# Patient Record
Sex: FEMALE | Race: WHITE | Employment: PART TIME | URBAN - METROPOLITAN AREA
[De-identification: names, ages, dates, MRNs, and addresses within clinical notes are randomized per-mention and may not be internally consistent; named-entity substitution may affect disease eponyms.]

---

## 2021-07-17 ENCOUNTER — HOSPITAL ENCOUNTER (EMERGENCY)
Age: 26
Discharge: HOME | End: 2021-07-17
Payer: MEDICAID

## 2021-07-17 ENCOUNTER — APPOINTMENT (OUTPATIENT)
Dept: ULTRASOUND IMAGING | Age: 26
End: 2021-07-17
Payer: MEDICAID

## 2021-07-17 VITALS
WEIGHT: 200 LBS | SYSTOLIC BLOOD PRESSURE: 127 MMHG | BODY MASS INDEX: 31.39 KG/M2 | HEART RATE: 70 BPM | RESPIRATION RATE: 16 BRPM | TEMPERATURE: 97.2 F | HEIGHT: 67 IN | OXYGEN SATURATION: 100 % | DIASTOLIC BLOOD PRESSURE: 99 MMHG

## 2021-07-17 DIAGNOSIS — B96.89 BACTERIAL VAGINOSIS IN PREGNANCY: ICD-10-CM

## 2021-07-17 DIAGNOSIS — R82.71 BACTERIURIA DURING PREGNANCY: ICD-10-CM

## 2021-07-17 DIAGNOSIS — R10.32 LEFT LOWER QUADRANT ABDOMINAL PAIN: ICD-10-CM

## 2021-07-17 DIAGNOSIS — O23.599 BACTERIAL VAGINOSIS IN PREGNANCY: ICD-10-CM

## 2021-07-17 DIAGNOSIS — Z3A.01 LESS THAN 8 WEEKS GESTATION OF PREGNANCY: Primary | ICD-10-CM

## 2021-07-17 DIAGNOSIS — Z72.0 TOBACCO ABUSE: ICD-10-CM

## 2021-07-17 DIAGNOSIS — O99.891 BACTERIURIA DURING PREGNANCY: ICD-10-CM

## 2021-07-17 LAB
A/G RATIO: 1.4 (ref 1.1–2.2)
ALBUMIN SERPL-MCNC: 3.7 G/DL (ref 3.4–5)
ALP BLD-CCNC: 46 U/L (ref 40–129)
ALT SERPL-CCNC: 10 U/L (ref 10–40)
AMORPHOUS: ABNORMAL /HPF
ANION GAP SERPL CALCULATED.3IONS-SCNC: 9 MMOL/L (ref 3–16)
AST SERPL-CCNC: 11 U/L (ref 15–37)
BACTERIA WET PREP: ABNORMAL
BACTERIA: ABNORMAL /HPF
BASOPHILS ABSOLUTE: 0 K/UL (ref 0–0.2)
BASOPHILS RELATIVE PERCENT: 0.5 %
BILIRUB SERPL-MCNC: 0.9 MG/DL (ref 0–1)
BILIRUBIN URINE: NEGATIVE
BLOOD, URINE: NEGATIVE
BUN BLDV-MCNC: 7 MG/DL (ref 7–20)
CALCIUM SERPL-MCNC: 8.9 MG/DL (ref 8.3–10.6)
CHLORIDE BLD-SCNC: 107 MMOL/L (ref 99–110)
CLARITY: CLEAR
CLUE CELLS: ABNORMAL
CO2: 23 MMOL/L (ref 21–32)
COLOR: YELLOW
CREAT SERPL-MCNC: <0.5 MG/DL (ref 0.6–1.1)
CRYSTALS, UA: ABNORMAL /HPF
EOSINOPHILS ABSOLUTE: 0.2 K/UL (ref 0–0.6)
EOSINOPHILS RELATIVE PERCENT: 2.5 %
EPITHELIAL CELLS WET PREP: ABNORMAL
EPITHELIAL CELLS, UA: ABNORMAL /HPF (ref 0–5)
GFR AFRICAN AMERICAN: >60
GFR NON-AFRICAN AMERICAN: >60
GLOBULIN: 2.6 G/DL
GLUCOSE BLD-MCNC: 63 MG/DL (ref 70–99)
GLUCOSE URINE: NEGATIVE MG/DL
GONADOTROPIN, CHORIONIC (HCG) QUANT: NORMAL MIU/ML
HCG QUALITATIVE: POSITIVE
HCT VFR BLD CALC: 39 % (ref 36–48)
HEMOGLOBIN: 13.4 G/DL (ref 12–16)
KETONES, URINE: ABNORMAL MG/DL
LEUKOCYTE ESTERASE, URINE: ABNORMAL
LYMPHOCYTES ABSOLUTE: 2.2 K/UL (ref 1–5.1)
LYMPHOCYTES RELATIVE PERCENT: 23.5 %
MAGNESIUM: 1.8 MG/DL (ref 1.8–2.4)
MCH RBC QN AUTO: 30.9 PG (ref 26–34)
MCHC RBC AUTO-ENTMCNC: 34.5 G/DL (ref 31–36)
MCV RBC AUTO: 89.6 FL (ref 80–100)
MICROSCOPIC EXAMINATION: YES
MONOCYTES ABSOLUTE: 0.7 K/UL (ref 0–1.3)
MONOCYTES RELATIVE PERCENT: 7.4 %
MUCUS: ABNORMAL /LPF
NEUTROPHILS ABSOLUTE: 6.1 K/UL (ref 1.7–7.7)
NEUTROPHILS RELATIVE PERCENT: 66.1 %
NITRITE, URINE: NEGATIVE
PDW BLD-RTO: 12.8 % (ref 12.4–15.4)
PH UA: 6 (ref 5–8)
PLATELET # BLD: 290 K/UL (ref 135–450)
PMV BLD AUTO: 7.4 FL (ref 5–10.5)
POTASSIUM REFLEX MAGNESIUM: 3.5 MMOL/L (ref 3.5–5.1)
PROTEIN UA: NEGATIVE MG/DL
RBC # BLD: 4.36 M/UL (ref 4–5.2)
RBC UA: ABNORMAL /HPF (ref 0–4)
RBC WET PREP: ABNORMAL
SODIUM BLD-SCNC: 139 MMOL/L (ref 136–145)
SOURCE WET PREP: ABNORMAL
SPECIFIC GRAVITY UA: >=1.03 (ref 1–1.03)
TOTAL PROTEIN: 6.3 G/DL (ref 6.4–8.2)
TRICHOMONAS PREP: ABNORMAL
TROPONIN: <0.01 NG/ML
URINE TYPE: ABNORMAL
UROBILINOGEN, URINE: 1 E.U./DL
WBC # BLD: 9.3 K/UL (ref 4–11)
WBC UA: ABNORMAL /HPF (ref 0–5)
WBC WET PREP: ABNORMAL
YEAST WET PREP: ABNORMAL

## 2021-07-17 PROCEDURE — 81001 URINALYSIS AUTO W/SCOPE: CPT

## 2021-07-17 PROCEDURE — 87210 SMEAR WET MOUNT SALINE/INK: CPT

## 2021-07-17 PROCEDURE — 84703 CHORIONIC GONADOTROPIN ASSAY: CPT

## 2021-07-17 PROCEDURE — 84484 ASSAY OF TROPONIN QUANT: CPT

## 2021-07-17 PROCEDURE — 80053 COMPREHEN METABOLIC PANEL: CPT

## 2021-07-17 PROCEDURE — 99284 EMERGENCY DEPT VISIT MOD MDM: CPT

## 2021-07-17 PROCEDURE — 83735 ASSAY OF MAGNESIUM: CPT

## 2021-07-17 PROCEDURE — 87491 CHLMYD TRACH DNA AMP PROBE: CPT

## 2021-07-17 PROCEDURE — 84702 CHORIONIC GONADOTROPIN TEST: CPT

## 2021-07-17 PROCEDURE — 87591 N.GONORRHOEAE DNA AMP PROB: CPT

## 2021-07-17 PROCEDURE — 85025 COMPLETE CBC W/AUTO DIFF WBC: CPT

## 2021-07-17 PROCEDURE — 76801 OB US < 14 WKS SINGLE FETUS: CPT

## 2021-07-17 RX ORDER — METRONIDAZOLE 500 MG/1
500 TABLET ORAL 3 TIMES DAILY
Qty: 30 TABLET | Refills: 0 | Status: SHIPPED | OUTPATIENT
Start: 2021-07-17 | End: 2021-07-27

## 2021-07-17 RX ORDER — VITAMIN A ACETATE, BETA CAROTENE, ASCORBIC ACID, CHOLECALCIFEROL, .ALPHA.-TOCOPHEROL ACETATE, DL-, THIAMINE MONONITRATE, RIBOFLAVIN, NIACINAMIDE, PYRIDOXINE HYDROCHLORIDE, FOLIC ACID, CYANOCOBALAMIN, CALCIUM CARBONATE, FERROUS FUMARATE, ZINC OXIDE, CUPRIC OXIDE 3080; 12; 120; 400; 1; 1.84; 3; 20; 22; 920; 25; 200; 27; 10; 2 [IU]/1; UG/1; MG/1; [IU]/1; MG/1; MG/1; MG/1; MG/1; MG/1; [IU]/1; MG/1; MG/1; MG/1; MG/1; MG/1
1 TABLET, FILM COATED ORAL DAILY
Qty: 90 TABLET | Refills: 0 | Status: SHIPPED | OUTPATIENT
Start: 2021-07-17 | End: 2021-10-15

## 2021-07-17 ASSESSMENT — PAIN SCALES - GENERAL: PAINLEVEL_OUTOF10: 5

## 2021-07-19 LAB
C TRACH DNA GENITAL QL NAA+PROBE: NEGATIVE
N. GONORRHOEAE DNA: NEGATIVE

## 2021-07-19 NOTE — ED PROVIDER NOTES
[address] ED  EMERGENCY DEPARTMENT ENCOUNTER        Pt Name: Denae Holden  MRN: [id number]  [place] 1995  Date of evaluation: 7/17/2021  Provider: TONI Mcpherson  PCP: No primary care provider on file. This patient was not seen and evaluated by the attending physician No att. providers found. I have evaluated this patient. My supervising physician was available for consultation. CHIEF COMPLAINT       Chief Complaint   Patient presents with    Abdominal Pain     LUQ abd pain x 3 weeks. HISTORY OF PRESENT ILLNESS   (Location/Symptom, Timing/Onset, Context/Setting, Quality, Duration, Modifying Factors, Severity)  Note limiting factors. Denae Holden is a 27 y.o. female who presents via private vehicle from her home for evaluation of abdominal pain. Patient notes that she has been having intermittent abdominal discomfort to the left side for the last couple days. Upon questioning she notes is also been going on for the last several weeks. She endorses associated nausea no vomiting. She denies any vaginal bleeding vaginal pain no discharge or concern for sexually transmitted infection. She notes that she is approximately 3 weeks late for her menstrual period. He denies any urinary symptoms no dysuria hematuria urinary frequency urgency. She denies any chest pain cough shortness of breath. No fevers. Nursing Notes were all reviewed and agreed with or any disagreements were addressed  in the HPI. Pt was seen during the [place] 19 pandemic. Appropriate PPE worn by ME during patient encounters. Pt seen during a time with constrained hospital bed capacity and other potential inpatient and outpatient resources were constrained due to the viral pandemic. REVIEW OF SYSTEMS    (2-9 systems for level 4, 10 or more for level 5)     Review of Systems    Positives and Pertinent negatives as per HPI.   Except as noted abovein the ROS, all other systems were reviewed and negative. PAST MEDICAL HISTORY     Past Medical History:   Diagnosis Date    Asthma     PCOS (polycystic ovarian syndrome)          SURGICAL HISTORY     Past Surgical History:   Procedure Laterality Date    TONSILLECTOMY           CURRENTMEDICATIONS       Discharge Medication List as of 7/17/2021  6:04 PM            ALLERGIES     Adhesive tape and Pcn [penicillins]    FAMILYHISTORY     History reviewed. No pertinent family history. SOCIAL HISTORY       Social History     Socioeconomic History    Marital status:      Spouse name: None    Number of children: None    Years of education: None    Highest education level: None   Occupational History    None   Tobacco Use    Smoking status: Current Every Day Smoker     Packs/day: 1.00     Types: Cigarettes    Smokeless tobacco: Never Used   Substance and Sexual Activity    Alcohol use: No    Drug use: Yes     Types: Methamphetamines    Sexual activity: Yes     Partners: Male   Other Topics Concern    None   Social History Narrative    None     Social Determinants of Health     Financial Resource Strain:     Difficulty of Paying Living Expenses:    Food Insecurity:     Worried About Running Out of Food in the Last Year:     Ran Out of Food in the Last Year:    Transportation Needs:     Lack of Transportation (Medical):      Lack of Transportation (Non-Medical):    Physical Activity:     Days of Exercise per Week:     Minutes of Exercise per Session:    Stress:     Feeling of Stress :    Social Connections:     Frequency of Communication with Friends and Family:     Frequency of Social Gatherings with Friends and Family:     Attends Anglican Services:     Active Member of Clubs or Organizations:     Attends Club or Organization Meetings:     Marital Status:    Intimate Partner Violence:     Fear of Current or Ex-Partner:     Emotionally Abused:     Physically Abused:     Sexually Abused:        SCREENINGS dry.      Capillary Refill: Capillary refill takes less than 2 seconds. Coloration: Skin is not jaundiced. Findings: No rash. Neurological:      General: No focal deficit present. Mental Status: She is alert, oriented to person, place, and time and easily aroused. Sensory: No sensory deficit. Motor: No weakness. Gait: Gait normal.   Psychiatric:         Behavior: Behavior normal. Behavior is cooperative. DIAGNOSTIC RESULTS   LABS:    Labs Reviewed   WET PREP, GENITAL - Abnormal; Notable for the following components:       Result Value    Clue Cells, Wet Prep 1+ (*)     All other components within normal limits    Narrative:     Performed at:  [facility] [place],  [facility]   Phone [phone]   COMPREHENSIVE METABOLIC PANEL W/ REFLEX TO MG FOR LOW K - Abnormal; Notable for the following components:    Glucose 63 (*)     CREATININE <0.5 (*)     Total Protein 6.3 (*)     AST 11 (*)     All other components within normal limits    Narrative:     Performed at:  [place] [address],  [employer] [facility]   Phone [phone]   URINALYSIS - Abnormal; Notable for the following components:    Ketones, Urine TRACE (*)     Leukocyte Esterase, Urine SMALL (*)     All other components within normal limits    Narrative:     Performed at:  [facility] [place],  [employer]   Phone [phone]   MICROSCOPIC URINALYSIS - Abnormal; Notable for the following components:    Mucus, UA 2+ (*)     WBC, UA 10-20 (*)     Bacteria, UA 2+ (*)     Crystals, UA Few Ca.  Oxalate (*)     All other components within normal limits    Narrative:     Performed at:  [facility],  [employer]   Phone [phone]   C.TRACHOMATIS [facility] DNA   CBC WITH AUTO DIFFERENTIAL    Narrative: Performed at:  [facility],  [place], [facility]   Phone [phone]   MAGNESIUM    Narrative:     Performed at:  [facility]) - [facility],  [place], [facility]   Phone [phone]   HCG, SERUM, QUALITATIVE    Narrative:     Performed at:  [facility],  [place], [facility]   Phone [phone]   TROPONIN    Narrative:     Performed at:  [facility]) - [facility],  [place], [facility]   Phone [phone]   HCG, QUANTITATIVE, PREGNANCY    Narrative:     Performed at:  [facility],  [place], [facility]   Phone [phone]       All other labs were within normal range or not returned as of this dictation. EKG: All EKG's are interpreted by the Emergency Department Physician who either signs orCo-signs this chart in the absence of a cardiologist.  Please see their note for interpretation of EKG. RADIOLOGY:   Non-plain film images such as CT, Ultrasound and MRI are read by the radiologist. Plain radiographic images are visualized andpreliminarily interpreted by the  ED Provider with the below findings:        Interpretation perthe Radiologist below, if available at the time of this note:    US OB LESS THAN 14 [facility]   Final Result   Single live intrauterine pregnancy with an estimated gestational age of 7.0   weeks. No significant adnexal abnormality. Normal Doppler flow. US PELVIS COMPLETE NON-OB TRANSABDOMINAL AND TRANSVAGINAL    (Results Pending)     US OB LESS THAN 14 WEEKS SINGLE OR FIRST GESTATION    Result Date: 7/18/2021  EXAMINATION: FIRST TRIMESTER OBSTETRIC ULTRASOUND 7/17/2021 TECHNIQUE: Transabdominal and transvaginal first trimester obstetric pelvic ultrasound was performed with color Doppler flow evaluation.  COMPARISON: None HISTORY: ORDERING SYSTEM PROVIDED HISTORY: ectopic TECHNOLOGIST PROVIDED HISTORY: Reason for exam:->ectopic Beta hCG 34,603 FINDINGS: Uterus: 8.3 x 5.4 x 4.1 cm Gestational Sac(s):  Single normal appearing gestational sac. No evidence of subchorionic hemorrhage. Yolk Sac:  Present Fetal Pole:  Single fetal pole Crown Rump Length:  1.09 cm Fetal Heart Rate:  147 beats per minute Right ovary: 2.8 x 2.8 x 2.5 cm. Normal Doppler flow. Left ovary: 3.1 x 3.0 x 3.1 cm. Normal Doppler flow. Free fluid: None visualized Measurements: Estimated gestational age by current ultrasound: 7.0 weeks Estimated gestational by LMP/prior ultrasound: 7.5 weeks Estimated Due Date: 3/5/2022     Single live intrauterine pregnancy with an estimated gestational age of 7.0 weeks. No significant adnexal abnormality. Normal Doppler flow. PROCEDURES   Unless otherwise noted below, none     Procedures    CRITICAL CARE TIME   N/A    CONSULTS:  None      EMERGENCY DEPARTMENT COURSE and DIFFERENTIALDIAGNOSIS/MDM:   Vitals:    Vitals:    07/17/21 1503 07/17/21 1530 07/17/21 1603 07/17/21 1803   BP: 109/83 121/82 125/88 (!) 127/99   Pulse: 68 70 64 70   Resp: 16 16 16 16   Temp:       TempSrc:       SpO2: 100% 100% 100% 100%   Weight:       Height:           Patient was given thefollowing medications:  Medications - No data to display    PDMP Monitoring:    Last PDMP Gregory as Reviewed [facility]):  Review User Review Instant Review Result            Urine Drug Screenings (1 yr)    No resulted procedures found. Medication Contract and Consent for Opioid Use Documents Filed      No documents found                MDM:   Patient seen and evaluated. Old records reviewed. Diagnostic testing reviewed and results discussed. I have independently evaluated this patient based upon my scope of practice. Supervising physician was in the department for consultation as needed.      Patient is a 13-year-old female who presents for evaluation of abdominal pain    3. Tobacco abuse    4. Bacteriuria during pregnancy    5.  Bacterial vaginosis in pregnancy          DISPOSITION/PLAN   DISPOSITION Decision To Discharge 07/17/2021 05:45:32 PM      PATIENT REFERREDTO:  Your Gynecologist    Schedule an appointment as soon as possible for a visit       [place] [facility]  [address] [facility]  Go to   If symptoms worsen      DISCHARGE MEDICATIONS:  Discharge Medication List as of 7/17/2021  6:04 PM      START taking these medications    Details   Prenatal Vit-Fe Fumarate-FA (PRENATAL PLUS) 27-1 MG TABS Take 1 tablet by mouth daily, Disp-90 tablet, R-0Print      metroNIDAZOLE (FLAGYL) 500 MG tablet Take 1 tablet by mouth 3 times daily for 10 days, Disp-30 tablet, R-0Print             DISCONTINUED MEDICATIONS:  Discharge Medication List as of 7/17/2021  6:04 PM                 (Please note that portions ofthis note were completed with a voice recognition program.  Efforts were made to edit the dictations but occasionally words are mis-transcribed.)    Sabi Shabazz (electronically signed)        Sabi Shabazz  07/18/21 1584

## 2021-09-13 LAB
ABO, EXTERNAL RESULT: NORMAL
HEP B, EXTERNAL RESULT: NEGATIVE
HEPATITIS C ANTIBODY, EXTERNAL RESULT: NEGATIVE
HIV, EXTERNAL RESULT: NON REACTIVE
RH FACTOR, EXTERNAL RESULT: POSITIVE
RPR, EXTERNAL RESULT: NON REACTIVE
RUBELLA TITER, EXTERNAL RESULT: NORMAL

## 2021-10-15 LAB
C. TRACHOMATIS, EXTERNAL RESULT: NEGATIVE
N. GONORRHOEAE, EXTERNAL RESULT: NEGATIVE

## 2022-01-11 ENCOUNTER — ANESTHESIA EVENT (OUTPATIENT)
Dept: LABOR AND DELIVERY | Age: 27
DRG: 560 | End: 2022-01-11
Payer: MEDICAID

## 2022-01-11 ENCOUNTER — ANESTHESIA (OUTPATIENT)
Dept: LABOR AND DELIVERY | Age: 27
DRG: 560 | End: 2022-01-11
Payer: MEDICAID

## 2022-01-11 ENCOUNTER — HOSPITAL ENCOUNTER (INPATIENT)
Age: 27
LOS: 1 days | Discharge: HOME | DRG: 560 | End: 2022-01-12
Attending: OBSTETRICS & GYNECOLOGY | Admitting: OBSTETRICS & GYNECOLOGY
Payer: MEDICAID

## 2022-01-11 PROBLEM — E66.812 CLASS II OBESITY: Status: ACTIVE | Noted: 2022-01-11

## 2022-01-11 PROBLEM — O42.90 RUPTURED MEMBRANES, PROLONGED: Status: ACTIVE | Noted: 2022-01-11

## 2022-01-11 PROBLEM — E66.9 CLASS II OBESITY: Status: ACTIVE | Noted: 2022-01-11

## 2022-01-11 PROBLEM — F15.10 METHAMPHETAMINE ABUSE (HCC): Status: ACTIVE | Noted: 2022-01-11

## 2022-01-11 PROBLEM — O60.00 PRETERM LABOR: Status: ACTIVE | Noted: 2022-01-11

## 2022-01-11 LAB
ABO/RH: NORMAL
AMPHETAMINE SCREEN, URINE: POSITIVE
ANISOCYTOSIS: ABNORMAL
ANTIBODY SCREEN: NORMAL
ATYPICAL LYMPHOCYTE RELATIVE PERCENT: 1 % (ref 0–6)
BACTERIA: ABNORMAL /HPF
BANDED NEUTROPHILS RELATIVE PERCENT: 51 % (ref 0–7)
BARBITURATE SCREEN URINE: ABNORMAL
BASOPHILS ABSOLUTE: 0 K/UL (ref 0–0.2)
BASOPHILS RELATIVE PERCENT: 0 %
BENZODIAZEPINE SCREEN, URINE: ABNORMAL
BILIRUBIN URINE: NEGATIVE
BLOOD, URINE: ABNORMAL
BUPRENORPHINE URINE: ABNORMAL
CANNABINOID SCREEN URINE: ABNORMAL
CLARITY: CLEAR
COCAINE METABOLITE SCREEN URINE: ABNORMAL
COLOR: ABNORMAL
EOSINOPHILS ABSOLUTE: 0 K/UL (ref 0–0.6)
EOSINOPHILS RELATIVE PERCENT: 0 %
EPITHELIAL CELLS, UA: ABNORMAL /HPF (ref 0–5)
GLUCOSE URINE: NEGATIVE MG/DL
HCT VFR BLD CALC: 40.1 % (ref 36–48)
HEMOGLOBIN: 13.8 G/DL (ref 12–16)
KETONES, URINE: NEGATIVE MG/DL
LEUKOCYTE ESTERASE, URINE: ABNORMAL
LYMPHOCYTES ABSOLUTE: 1.4 K/UL (ref 1–5.1)
LYMPHOCYTES RELATIVE PERCENT: 3 %
Lab: ABNORMAL
MCH RBC QN AUTO: 30.5 PG (ref 26–34)
MCHC RBC AUTO-ENTMCNC: 34.5 G/DL (ref 31–36)
MCV RBC AUTO: 88.5 FL (ref 80–100)
METHADONE SCREEN, URINE: ABNORMAL
MICROSCOPIC EXAMINATION: YES
MONOCYTES ABSOLUTE: 2.5 K/UL (ref 0–1.3)
MONOCYTES RELATIVE PERCENT: 7 %
MUCUS: ABNORMAL /LPF
NEUTROPHILS ABSOLUTE: 31.4 K/UL (ref 1.7–7.7)
NEUTROPHILS RELATIVE PERCENT: 38 %
NITRITE, URINE: NEGATIVE
OPIATE SCREEN URINE: ABNORMAL
OXYCODONE URINE: ABNORMAL
PDW BLD-RTO: 12.6 % (ref 12.4–15.4)
PH UA: 6
PH UA: 6 (ref 5–8)
PHENCYCLIDINE SCREEN URINE: ABNORMAL
PLATELET # BLD: 262 K/UL (ref 135–450)
PLATELET SLIDE REVIEW: ADEQUATE
PMV BLD AUTO: 8.3 FL (ref 5–10.5)
POIKILOCYTES: ABNORMAL
PROPOXYPHENE SCREEN: ABNORMAL
PROTEIN UA: ABNORMAL MG/DL
RBC # BLD: 4.54 M/UL (ref 4–5.2)
RBC UA: ABNORMAL /HPF (ref 0–4)
SLIDE REVIEW: ABNORMAL
SPECIFIC GRAVITY UA: >=1.03 (ref 1–1.03)
TOXIC GRANULATION: PRESENT
URINE TYPE: ABNORMAL
UROBILINOGEN, URINE: 0.2 E.U./DL
VACUOLATED NEUTROPHILS: PRESENT
WBC # BLD: 35.3 K/UL (ref 4–11)
WBC UA: ABNORMAL /HPF (ref 0–5)

## 2022-01-11 PROCEDURE — 86900 BLOOD TYPING SEROLOGIC ABO: CPT

## 2022-01-11 PROCEDURE — 1220000000 HC SEMI PRIVATE OB R&B

## 2022-01-11 PROCEDURE — 86901 BLOOD TYPING SEROLOGIC RH(D): CPT

## 2022-01-11 PROCEDURE — 81001 URINALYSIS AUTO W/SCOPE: CPT

## 2022-01-11 PROCEDURE — 2500000003 HC RX 250 WO HCPCS: Performed by: NURSE ANESTHETIST, CERTIFIED REGISTERED

## 2022-01-11 PROCEDURE — 51701 INSERT BLADDER CATHETER: CPT

## 2022-01-11 PROCEDURE — 80307 DRUG TEST PRSMV CHEM ANLYZR: CPT

## 2022-01-11 PROCEDURE — 2580000003 HC RX 258: Performed by: OBSTETRICS & GYNECOLOGY

## 2022-01-11 PROCEDURE — 86780 TREPONEMA PALLIDUM: CPT

## 2022-01-11 PROCEDURE — 85025 COMPLETE CBC W/AUTO DIFF WBC: CPT

## 2022-01-11 PROCEDURE — 86850 RBC ANTIBODY SCREEN: CPT

## 2022-01-11 PROCEDURE — 6360000002 HC RX W HCPCS: Performed by: OBSTETRICS & GYNECOLOGY

## 2022-01-11 RX ORDER — ONDANSETRON 2 MG/ML
4 INJECTION INTRAMUSCULAR; INTRAVENOUS EVERY 6 HOURS PRN
Status: DISCONTINUED | OUTPATIENT
Start: 2022-01-11 | End: 2022-01-12

## 2022-01-11 RX ORDER — SODIUM CHLORIDE, SODIUM LACTATE, POTASSIUM CHLORIDE, AND CALCIUM CHLORIDE .6; .31; .03; .02 G/100ML; G/100ML; G/100ML; G/100ML
500 INJECTION, SOLUTION INTRAVENOUS PRN
Status: DISCONTINUED | OUTPATIENT
Start: 2022-01-11 | End: 2022-01-12

## 2022-01-11 RX ORDER — DOCUSATE SODIUM 100 MG/1
100 CAPSULE, LIQUID FILLED ORAL 2 TIMES DAILY
Status: DISCONTINUED | OUTPATIENT
Start: 2022-01-11 | End: 2022-01-12

## 2022-01-11 RX ORDER — SODIUM CHLORIDE, SODIUM LACTATE, POTASSIUM CHLORIDE, AND CALCIUM CHLORIDE .6; .31; .03; .02 G/100ML; G/100ML; G/100ML; G/100ML
1000 INJECTION, SOLUTION INTRAVENOUS PRN
Status: DISCONTINUED | OUTPATIENT
Start: 2022-01-11 | End: 2022-01-12

## 2022-01-11 RX ORDER — SODIUM CHLORIDE 9 MG/ML
25 INJECTION, SOLUTION INTRAVENOUS PRN
Status: DISCONTINUED | OUTPATIENT
Start: 2022-01-11 | End: 2022-01-12

## 2022-01-11 RX ORDER — BUPIVACAINE HYDROCHLORIDE 2.5 MG/ML
INJECTION, SOLUTION EPIDURAL; INFILTRATION; INTRACAUDAL
Status: COMPLETED
Start: 2022-01-11 | End: 2022-01-11

## 2022-01-11 RX ORDER — ACETAMINOPHEN 325 MG/1
650 TABLET ORAL EVERY 4 HOURS PRN
Status: DISCONTINUED | OUTPATIENT
Start: 2022-01-11 | End: 2022-01-12

## 2022-01-11 RX ORDER — ACETAMINOPHEN 325 MG/1
650 TABLET ORAL EVERY 6 HOURS PRN
COMMUNITY

## 2022-01-11 RX ORDER — SODIUM CHLORIDE 0.9 % (FLUSH) 0.9 %
5-40 SYRINGE (ML) INJECTION EVERY 12 HOURS SCHEDULED
Status: DISCONTINUED | OUTPATIENT
Start: 2022-01-11 | End: 2022-01-12

## 2022-01-11 RX ORDER — SODIUM CHLORIDE 0.9 % (FLUSH) 0.9 %
5-40 SYRINGE (ML) INJECTION PRN
Status: DISCONTINUED | OUTPATIENT
Start: 2022-01-11 | End: 2022-01-12

## 2022-01-11 RX ORDER — SODIUM CHLORIDE, SODIUM LACTATE, POTASSIUM CHLORIDE, CALCIUM CHLORIDE 600; 310; 30; 20 MG/100ML; MG/100ML; MG/100ML; MG/100ML
INJECTION, SOLUTION INTRAVENOUS CONTINUOUS
Status: DISCONTINUED | OUTPATIENT
Start: 2022-01-11 | End: 2022-01-12

## 2022-01-11 RX ORDER — BUPIVACAINE HYDROCHLORIDE 2.5 MG/ML
INJECTION, SOLUTION EPIDURAL; INFILTRATION; INTRACAUDAL PRN
Status: DISCONTINUED | OUTPATIENT
Start: 2022-01-11 | End: 2022-01-12 | Stop reason: SDUPTHER

## 2022-01-11 RX ORDER — BUPIVACAINE HYDROCHLORIDE 5 MG/ML
INJECTION, SOLUTION EPIDURAL; INTRACAUDAL
Status: COMPLETED
Start: 2022-01-11 | End: 2022-01-12

## 2022-01-11 RX ORDER — BETAMETHASONE SODIUM PHOSPHATE AND BETAMETHASONE ACETATE 3; 3 MG/ML; MG/ML
12 INJECTION, SUSPENSION INTRA-ARTICULAR; INTRALESIONAL; INTRAMUSCULAR; SOFT TISSUE EVERY 24 HOURS
Status: DISCONTINUED | OUTPATIENT
Start: 2022-01-11 | End: 2022-01-12

## 2022-01-11 RX ADMIN — SODIUM CHLORIDE, POTASSIUM CHLORIDE, SODIUM LACTATE AND CALCIUM CHLORIDE: 600; 310; 30; 20 INJECTION, SOLUTION INTRAVENOUS at 23:16

## 2022-01-11 RX ADMIN — Medication 15 ML/HR: at 20:05

## 2022-01-11 RX ADMIN — BETAMETHASONE SODIUM PHOSPHATE AND BETAMETHASONE ACETATE 12 MG: 3; 3 INJECTION, SUSPENSION INTRA-ARTICULAR; INTRALESIONAL; INTRAMUSCULAR at 19:27

## 2022-01-11 RX ADMIN — BUPIVACAINE HYDROCHLORIDE 2 ML: 2.5 INJECTION, SOLUTION EPIDURAL; INFILTRATION; INTRACAUDAL; PERINEURAL at 20:00

## 2022-01-11 RX ADMIN — SODIUM CHLORIDE, POTASSIUM CHLORIDE, SODIUM LACTATE AND CALCIUM CHLORIDE: 600; 310; 30; 20 INJECTION, SOLUTION INTRAVENOUS at 19:50

## 2022-01-11 ASSESSMENT — LIFESTYLE VARIABLES: SMOKING_STATUS: 1

## 2022-01-11 NOTE — PROGRESS NOTES
Brief Note:    S: Called to see patient due to suspected PPROM and possible advanced cervical dilation. Reports some ? able leakage of fluid last evening after intercourse with contractions that started this afternoon after seen in the office. No VB, +FM. OB History    Para Term  AB Living   1             SAB IAB Ectopic Molar Multiple Live Births                    # Outcome Date GA Lbr Douglas/2nd Weight Sex Delivery Anes PTL Lv   1 Current              Past Medical History:   Diagnosis Date    Asthma     PCOS (polycystic ovarian syndrome)      Past Surgical History:   Procedure Laterality Date    TONSILLECTOMY         No current facility-administered medications on file prior to encounter. Current Outpatient Medications on File Prior to Encounter   Medication Sig Dispense Refill    Prenatal Vit-Fe Fumarate-FA (PRENATAL PLUS) 27-1 MG TABS Take 1 tablet by mouth daily 90 tablet 0       O:  Vitals:    22 1832   Resp: 20   Temp: 98 °F (36.7 °C)     Appears very uncomfortable  Vaginal exam: appears ruptured on external exam, no SSE done due to pt discomfort and concern for possible precipitous delivery   SVE: 3/75/0     FHT: difficult to monitor due to patient discomfort, 165, min-mod variability, +accel, ?variable decels  Zaleski: q3-4 min    A/P: Patient is a 31yo  at 32+3 with suspected PPROM and PTL    1. FWB   - difficult to monitor due to patient discomfort and movements    2. PTL/PPROM  - appears grossly ruptured, 3-4cm dilated on exam    Exam/findings communicated by me directly to Dr. Cyndee Marlow, plan for admission, antibiotics, and steroids for fetal lung maturity.      Sampson Carter MD

## 2022-01-12 VITALS
HEART RATE: 86 BPM | SYSTOLIC BLOOD PRESSURE: 107 MMHG | DIASTOLIC BLOOD PRESSURE: 62 MMHG | OXYGEN SATURATION: 100 % | TEMPERATURE: 98 F | WEIGHT: 233 LBS | BODY MASS INDEX: 36.57 KG/M2 | HEIGHT: 67 IN | RESPIRATION RATE: 16 BRPM

## 2022-01-12 LAB
SARS-COV-2, NAAT: NOT DETECTED
TOTAL SYPHILLIS IGG/IGM: NORMAL

## 2022-01-12 PROCEDURE — 6370000000 HC RX 637 (ALT 250 FOR IP): Performed by: OBSTETRICS & GYNECOLOGY

## 2022-01-12 PROCEDURE — 88307 TISSUE EXAM BY PATHOLOGIST: CPT

## 2022-01-12 PROCEDURE — 6360000002 HC RX W HCPCS

## 2022-01-12 PROCEDURE — 2500000003 HC RX 250 WO HCPCS: Performed by: NURSE ANESTHETIST, CERTIFIED REGISTERED

## 2022-01-12 PROCEDURE — 87635 SARS-COV-2 COVID-19 AMP PRB: CPT

## 2022-01-12 PROCEDURE — 6370000000 HC RX 637 (ALT 250 FOR IP)

## 2022-01-12 PROCEDURE — 0HQ9XZZ REPAIR PERINEUM SKIN, EXTERNAL APPROACH: ICD-10-PCS | Performed by: OBSTETRICS & GYNECOLOGY

## 2022-01-12 PROCEDURE — 51701 INSERT BLADDER CATHETER: CPT

## 2022-01-12 PROCEDURE — 7200000001 HC VAGINAL DELIVERY

## 2022-01-12 RX ORDER — SODIUM CHLORIDE, SODIUM LACTATE, POTASSIUM CHLORIDE, CALCIUM CHLORIDE 600; 310; 30; 20 MG/100ML; MG/100ML; MG/100ML; MG/100ML
INJECTION, SOLUTION INTRAVENOUS CONTINUOUS
Status: DISCONTINUED | OUTPATIENT
Start: 2022-01-12 | End: 2022-01-12 | Stop reason: HOSPADM

## 2022-01-12 RX ORDER — SODIUM CHLORIDE 0.9 % (FLUSH) 0.9 %
5-40 SYRINGE (ML) INJECTION PRN
Status: DISCONTINUED | OUTPATIENT
Start: 2022-01-12 | End: 2022-01-12 | Stop reason: HOSPADM

## 2022-01-12 RX ORDER — IBUPROFEN 800 MG/1
800 TABLET ORAL EVERY 8 HOURS
Qty: 30 TABLET | Refills: 0 | Status: SHIPPED | OUTPATIENT
Start: 2022-01-12

## 2022-01-12 RX ORDER — SODIUM CHLORIDE 9 MG/ML
25 INJECTION, SOLUTION INTRAVENOUS PRN
Status: DISCONTINUED | OUTPATIENT
Start: 2022-01-12 | End: 2022-01-12 | Stop reason: HOSPADM

## 2022-01-12 RX ORDER — LANOLIN 100 %
OINTMENT (GRAM) TOPICAL PRN
Status: DISCONTINUED | OUTPATIENT
Start: 2022-01-12 | End: 2022-01-12 | Stop reason: HOSPADM

## 2022-01-12 RX ORDER — OXYCODONE HYDROCHLORIDE 5 MG/1
10 TABLET ORAL EVERY 4 HOURS PRN
Status: DISCONTINUED | OUTPATIENT
Start: 2022-01-12 | End: 2022-01-12 | Stop reason: HOSPADM

## 2022-01-12 RX ORDER — MISOPROSTOL 100 UG/1
800 TABLET ORAL PRN
Status: DISCONTINUED | OUTPATIENT
Start: 2022-01-12 | End: 2022-01-12 | Stop reason: HOSPADM

## 2022-01-12 RX ORDER — OXYCODONE HYDROCHLORIDE 5 MG/1
5 TABLET ORAL EVERY 4 HOURS PRN
Status: DISCONTINUED | OUTPATIENT
Start: 2022-01-12 | End: 2022-01-12 | Stop reason: HOSPADM

## 2022-01-12 RX ORDER — DOCUSATE SODIUM 100 MG/1
100 CAPSULE, LIQUID FILLED ORAL 2 TIMES DAILY
Status: DISCONTINUED | OUTPATIENT
Start: 2022-01-12 | End: 2022-01-12 | Stop reason: HOSPADM

## 2022-01-12 RX ORDER — BUPIVACAINE HYDROCHLORIDE 5 MG/ML
INJECTION, SOLUTION EPIDURAL; INTRACAUDAL PRN
Status: DISCONTINUED | OUTPATIENT
Start: 2022-01-12 | End: 2022-01-12 | Stop reason: SDUPTHER

## 2022-01-12 RX ORDER — FERROUS SULFATE 325(65) MG
325 TABLET ORAL 2 TIMES DAILY WITH MEALS
Status: DISCONTINUED | OUTPATIENT
Start: 2022-01-12 | End: 2022-01-12 | Stop reason: HOSPADM

## 2022-01-12 RX ORDER — DIPHENHYDRAMINE HYDROCHLORIDE 50 MG/ML
INJECTION INTRAMUSCULAR; INTRAVENOUS
Status: COMPLETED
Start: 2022-01-12 | End: 2022-01-12

## 2022-01-12 RX ORDER — DOCUSATE SODIUM 100 MG/1
100 CAPSULE, LIQUID FILLED ORAL 2 TIMES DAILY
Qty: 60 CAPSULE | Refills: 0 | Status: SHIPPED | OUTPATIENT
Start: 2022-01-12

## 2022-01-12 RX ORDER — DIPHENHYDRAMINE HYDROCHLORIDE 50 MG/ML
25 INJECTION INTRAMUSCULAR; INTRAVENOUS ONCE
Status: COMPLETED | OUTPATIENT
Start: 2022-01-12 | End: 2022-01-12

## 2022-01-12 RX ORDER — SODIUM CHLORIDE 0.9 % (FLUSH) 0.9 %
5-40 SYRINGE (ML) INJECTION EVERY 12 HOURS SCHEDULED
Status: DISCONTINUED | OUTPATIENT
Start: 2022-01-12 | End: 2022-01-12 | Stop reason: HOSPADM

## 2022-01-12 RX ORDER — IBUPROFEN 800 MG/1
800 TABLET ORAL EVERY 8 HOURS
Status: DISCONTINUED | OUTPATIENT
Start: 2022-01-13 | End: 2022-01-12

## 2022-01-12 RX ORDER — ONDANSETRON 2 MG/ML
4 INJECTION INTRAMUSCULAR; INTRAVENOUS EVERY 6 HOURS PRN
Status: DISCONTINUED | OUTPATIENT
Start: 2022-01-12 | End: 2022-01-12 | Stop reason: HOSPADM

## 2022-01-12 RX ORDER — ACETAMINOPHEN 325 MG/1
650 TABLET ORAL EVERY 4 HOURS PRN
Status: DISCONTINUED | OUTPATIENT
Start: 2022-01-12 | End: 2022-01-12 | Stop reason: HOSPADM

## 2022-01-12 RX ORDER — IBUPROFEN 800 MG/1
800 TABLET ORAL EVERY 8 HOURS
Status: DISCONTINUED | OUTPATIENT
Start: 2022-01-12 | End: 2022-01-12 | Stop reason: HOSPADM

## 2022-01-12 RX ADMIN — WITCH HAZEL 40 EACH: 500 SOLUTION RECTAL; TOPICAL at 14:55

## 2022-01-12 RX ADMIN — BUPIVACAINE HYDROCHLORIDE 4 ML: 5 INJECTION, SOLUTION EPIDURAL; INTRACAUDAL; PERINEURAL at 02:17

## 2022-01-12 RX ADMIN — DIPHENHYDRAMINE HYDROCHLORIDE 25 MG: 50 INJECTION, SOLUTION INTRAMUSCULAR; INTRAVENOUS at 05:12

## 2022-01-12 RX ADMIN — MOXIFLOXACIN HYDROCHLORIDE 800 MG: 400 TABLET, FILM COATED ORAL at 10:22

## 2022-01-12 RX ADMIN — DIPHENHYDRAMINE HYDROCHLORIDE 25 MG: 50 INJECTION INTRAMUSCULAR; INTRAVENOUS at 05:12

## 2022-01-12 RX ADMIN — BUPIVACAINE HYDROCHLORIDE 4 ML: 2.5 INJECTION, SOLUTION EPIDURAL; INFILTRATION; INTRACAUDAL; PERINEURAL at 02:17

## 2022-01-12 RX ADMIN — DOCUSATE SODIUM 100 MG: 100 CAPSULE ORAL at 10:21

## 2022-01-12 ASSESSMENT — PAIN SCALES - GENERAL: PAINLEVEL_OUTOF10: 6

## 2022-01-12 NOTE — ANESTHESIA PRE PROCEDURE
Department of Anesthesiology  Preprocedure Note       Name:  Rick Barbour   Age:  27 y.o.  :  1995                                          MRN:  [id number]         Date:  2022      Surgeon: * No surgeons listed *    Procedure: * No procedures listed *    Medications prior to admission:   Prior to Admission medications    Medication Sig Start Date End Date Taking?  Authorizing Provider   Prenatal Vit-Fe Fumarate-FA (PRENATAL PLUS) 27-1 MG TABS Take 1 tablet by mouth daily 7/17/21 10/15/21  TONI Rios       Current medications:    Current Facility-Administered Medications   Medication Dose Route Frequency Provider Last Rate Last Admin    oxytocin (PITOCIN) 30 units in 500 mL infusion Override Pull             lactated ringers infusion   IntraVENous Continuous Rosalva Lovette, DO        lactated ringers bolus  500 mL IntraVENous PRN Anne [place], DO        Or    lactated ringers bolus  1,000 mL IntraVENous PRN Anne [place], DO        sodium chloride flush 0.9 % injection 5-40 mL  5-40 mL IntraVENous 2 times per day Anne [place], DO        sodium chloride flush 0.9 % injection 5-40 mL  5-40 mL IntraVENous PRN Rosalva Lovette, DO        0.9 % sodium chloride infusion  25 mL IntraVENous PRN Rosalva Lovette, DO        ondansetron [facility]) injection 4 mg  4 mg IntraVENous Q6H PRN Anne [place], DO        acetaminophen (TYLENOL) tablet 650 mg  650 mg Oral Q4H PRN Rosalva Lovette, DO        benzocaine-menthol (DERMOPLAST) 20-0.5 % spray   Topical PRN Anne [place], DO        docusate sodium (COLACE) capsule 100 mg  100 mg Oral BID Anne [place], DO        witch hazel-glycerin (TUCKS) pad   Topical PRN Anne [place], DO        vancomycin 1000 mg IVPB in 250 mL D5W addavial  1,000 mg IntraVENous Q12H Anne [place],  mL/hr at 22 1,000 mg at 22    betamethasone acetate-betamethasone sodium phosphate (CELESTONE) injection 12 mg  12 mg IntraMUSCular Q24H Cynthia Yuen, DO   12 mg at 01/11/22 1927    oxytocin (PITOCIN) 30 units in 500 mL infusion Override Pull             bupivacaine (PF) (MARCAINE) 0.5 % injection              Facility-Administered Medications Ordered in Other Encounters   Medication Dose Route Frequency Provider Last Rate Last Admin    bupivacaine (PF) (MARCAINE) 0.25 % injection   IntraSPINal PRN Mary Ann Carson, APRN - CRNA   2 mL at 01/11/22 2000    sodium chloride 0.9 % 200 mL with fentaNYL 500 mcg, bupivacaine 0.5% 50 mL (OB) epidural   Epidural Continuous PRN Mary Ann Carson, APRN - CRNA 15 mL/hr at 01/11/22 2005 15 mL/hr at 01/11/22 2005       Allergies: Allergies   Allergen Reactions    Latex Rash    Adhesive Tape     Pcn [Penicillins]        Problem List:  There is no problem list on file for this patient. Past Medical History:        Diagnosis Date    Asthma     PCOS (polycystic ovarian syndrome)        Past Surgical History:        Procedure Laterality Date    TONSILLECTOMY         Social History:    Social History     Tobacco Use    Smoking status: Current Every Day Smoker     Packs/day: 1.00     Types: Cigarettes    Smokeless tobacco: Never Used   Substance Use Topics    Alcohol use:  No                                Ready to quit: Not Answered  Counseling given: Not Answered      Vital Signs (Current):   Vitals:    01/11/22 1832   Resp: 20   Temp: 36.7 °C (98 °F)   TempSrc: Oral                                              BP Readings from Last 3 Encounters:   07/17/21 (!) 127/99   09/23/17 115/76   03/10/16 (!) 146/115       NPO Status:                                                                                 BMI:   Wt Readings from Last 3 Encounters:   07/17/21 200 lb (90.7 kg)   09/23/17 160 lb (72.6 kg)   03/10/16 190 lb (86.2 kg)     There is no height or weight on file to calculate BMI.    CBC:   Lab Results   Component Value Date    WBC 35.3 01/11/2022    RBC 4.54 01/11/2022    HGB 13.8 01/11/2022    HCT 40.1 01/11/2022    MCV 88.5 01/11/2022    RDW 12.6 01/11/2022     01/11/2022       CMP:   Lab Results   Component Value Date     07/17/2021    K 3.5 07/17/2021     07/17/2021    CO2 23 07/17/2021    BUN 7 07/17/2021    CREATININE <0.5 07/17/2021    GFRAA >60 07/17/2021    GFRAA >60 12/26/2012    AGRATIO 1.4 07/17/2021    LABGLOM >60 07/17/2021    GLUCOSE 63 07/17/2021    PROT 6.3 07/17/2021    PROT 7.0 12/26/2012    CALCIUM 8.9 07/17/2021    BILITOT 0.9 07/17/2021    ALKPHOS 46 07/17/2021    AST 11 07/17/2021    ALT 10 07/17/2021       POC Tests: No results for input(s): POCGLU, POCNA, POCK, POCCL, POCBUN, POCHEMO, POCHCT in the last 72 hours. Coags: No results found for: PROTIME, INR, APTT    HCG (If Applicable):   Lab Results   Component Value Date    PREGTESTUR Negative 09/23/2017        ABGs: No results found for: PHART, PO2ART, NUA0RHR, RJC8QCI, BEART, A8NSPXGW     Type & Screen (If Applicable):  No results found for: LABABO, LABRH    Drug/Infectious Status (If Applicable):  No results found for: HIV, HEPCAB    COVID-19 Screening (If Applicable): No results found for: COVID19        Anesthesia Evaluation  Patient summary reviewed and Nursing notes reviewed no history of anesthetic complications:   Airway: Mallampati: II  TM distance: >3 FB   Neck ROM: full  Mouth opening: > = 3 FB Dental:          Pulmonary:   (+) asthma: current smoker                          ROS comment: Meth user   Cardiovascular:Negative CV ROS                      Neuro/Psych:   (+) psychiatric history (Meth addicted):            GI/Hepatic/Renal: Neg GI/Hepatic/Renal ROS  (+) morbid obesity          Endo/Other: Negative Endo/Other ROS                    Abdominal:   (+) obese,           Vascular: negative vascular ROS. Other Findings:             Anesthesia Plan      epidural     ASA 3 - emergent             Anesthetic plan and risks discussed with patient.       Plan discussed with attending. Alternatives to, benifits and risks of continuous lumbar epidural for labor (including, but not limited to, hypotension, spinal headache, inadequate sensory blockade) were discussed in detail with the patient. All questions were answered to her satisfaction.   The patient desires and agrees to proceed with continuous epidural.        Marlon Redman, MISTI - CRNA   1/11/2022

## 2022-01-12 NOTE — PROGRESS NOTES
Discharge Phone Call Log  Patient Name: Jacobo Kulkarni     [facility] Care Provider: Ina Sheikh DO Discharge Date: 2022    Disposition of baby:    Phone Number: [phone] (home)     Attempts to Contact:  Date:    Nurse  Date:    Nurse  Date:    Nurse    1. Now that you are at home is your pain being well controlled? Y/N   What pain reducing measures are you using? ____________________________________        Information for the patient's :  Delphine Mccain [place] [[id number]]   Delivery Method: Vaginal, Spontaneous     2. Are you currently  having any infant feeding issues? Y/N _____________________________ If yes, please explain: __________________________________________________________________  3. If breastfeeding, were you satisfied with the breastfeeding support services offered? Y/N  4.  Have you had to supplement? Y/N If yes, please explain: _____________________________________________________       Did you supplement while in the hospital, or begin formula supplementation at home?________________________________________  5. Did your OB provider offer you information about the benefits of breastfeeding during your prenatal visits? Y/N  6.  Have you made or have you already had your first appointment with the baby's doctor? Y/N If no, do you know when to schedule it? Y/N   7.  Have you scheduled your follow-up appointment? Y/N  If no, do you know when to schedule it? Y/N  8. Did staff discuss safe sleep during your stay? Y/N  Did you see the wall cling posted in your room explaining how to keep you and your baby safe? Y/N  10. Did your nurses and physicians include you in the plan of care, communicating with you respectfully? Y/N If no, please explain __________________________  11. Is there anyone in particular you would like to mention who provided care for you? ________________________________  12. Did your discharge occur in a timely manner?   Y/N If no, please explain __________________________  13. Do you have any other questions or concerns I can address today?  Y/N  __________________________________________________      Teaching During interview :_____________________________________________  ___________________________RN       Date:______________Time:________________

## 2022-01-12 NOTE — PROGRESS NOTES
Dr. Palmer Limb notified of repetitive late decelerations despite interventions. Difficult to trace contractions. Requesting internal monitors. MD is okay for house MD to place internals.

## 2022-01-12 NOTE — DISCHARGE SUMMARY
Department of Obstetrics and Gynecology  Labor and Delivery  Post Partum Progress Note      SUBJECTIVE:  27 y.o. yo  PPD # 0 s/p  . Pain is controlled. Lochia is light. Tolerating po, ambulating, voiding without difficulty. OBJECTIVE:      Vitals:  /62   Pulse 86   Temp 98 °F (36.7 °C) (Axillary)   Resp 16   Ht 5' 7\" (1.702 m)   Wt 233 lb (105.7 kg)   SpO2 100%   Breastfeeding Unknown   BMI 36.49 kg/m²     CONSTITUTIONAL:  awake, alert, cooperative, no apparent distress, and appears stated age  ABDOMEN:  Nontender, fundus firm at U-1  CHEST/BREASTS:  no increased work of breathing  MUSCULOSKELETAL:  nontender legs, trace edema    DATA:    WBC/Hgb/Hct/Plts:  35.3/13.8/40.1/262 (1850)     ASSESSMENT & PLAN:      Active Problems:    Postpartum care following  delivery    [facility], [facility]    Social work services have seen patient due to substance abuse (methamphetamine) and resource information given. Plan: Routine postpartum care  Discharge to home today as requested.    Patient was counseled on routine postpartum precautions    Obstetrical Discharge Form    Primary OB Clin[facility]     EDC: Estimated Date of Delivery: 3/5/22    Gestational Age:32w4d    Antepartum complications: methamphetamine and tobacco use in pregnancy, social problems including history of felony, size < dates diagnosed yesterday    Date of Delivery: 2022    Delivered By: Dicie Shelter, DO     Delivery Type: spontaneous vaginal delivery    Baby: Liveborn male, Apgars 3/5/5, weight 1881gm, 4 #, 2 oz,     Anesthesia: Epidural    Intrapartum complications:  labor, NRFHT    Episiotomy: mediolateral without extension,    Feeding method: formula    Rh Immune globulin given: not applicable    Rubella vaccine given: ordered, patient declined after counseling    Discharge Date: 2022;    Condition:  stable    Plan: Follow-up appointment with Dr. Archibald Fothergill in 4 weeks.

## 2022-01-12 NOTE — PROGRESS NOTES
Pt discharged, instructions discussed and given. RX sent to pharmacy. Pt refused MMR at this time. Infant is admitted at [address] [facility] at this time.

## 2022-01-12 NOTE — PROGRESS NOTES
Dr. Braden Quiroga at bedside for SVE. PT is complete and +2. Room being set up for delivery. Pediatrician/SCN notified.

## 2022-01-12 NOTE — PROGRESS NOTES
Called and notified Dr. Andrey Meigs ([id number]) of pt's SVE and requested to come to hospital for impending delivery.   Silverio Reyes RN

## 2022-01-12 NOTE — L&D DELIVERY NOTE
Department of Obstetrics and Gynecology  Spontaneous Vaginal Delivery Note      Pre-operative Diagnosis:   pregnancy <37 weeks and Spontaneous labor, PPROM, prolonged rupture of membranes    Post-operative Diagnosis:  Living  infant(s) and Male    Information for the patient's :  Zena Quezada [[id number]]                  Infant Wt:   Information for the patient's :  Zena Quezada [[id number]]           APGARS:     Information for the patient's :  Zena Quezada [[id number]]        Anesthesia:  epidural    Delivery summary:      Pt presented in labor at [address] Found to be 3-4cm dilated and grossly ruptured, spontaneously since  yesterday (almost 31 hours prior). Her urine drug screen was positive for methamphetamines on admission. She was started on vancomycin 1g q12h for GBS unknown (severe PCN allergy) receiving 1 dose total. She is s/p Celestone 12mg x1 dose to accelerate fetal lung maturity. She became complete and pushed with epidural anesthesia. She delivered a viable male  infant over a RML episiotomy. Both shoulders delivered atruamatically. Body cord x1 reduced. Short cord, small segment collected for cord gases. Placenta spontaneous, intact. Bladder catheterized 200mL of clear urine. Fundus firm pitocin running. Vagina and ALPESH cleared of clots and debris. RML episiotomy repaired in normal surgical fashion. A small 1 cm 1st degree perineal laceration noted, hemostatic not requiring repair. Patient tolerated well. Instrument, needles, sponge count correct.  EBL 300mL    APGAR assignment pending  birth weight 1881 grams 4lb 2.3 oz     Delivery Summary:       Specimen:  Placenta sent to pathology     Intake/Output:     Date 22 - 22 0722 - 22 07   Shift 4050-5822 6287-1486 24 Hour Total 7735-9972 7639-9373 24 Hour Total   INTAKE   I.V.  307.2 307.2      Shift Total 307.2 307.2      OUTPUT   Urine  900 900      Shift Total  900 900      NET  -592.8 -592.8          Condition:  infant stable to special care nursery and mother stable    Blood Type and Rh: A POS        Rubella Immunity Status:   Immune           Infant Feeding:    unknown    Attending Attestation: I performed the procedure.     Dannie Norton DO

## 2022-01-12 NOTE — PLAN OF CARE
Problem: Discharge Planning:  Goal: Discharged to appropriate level of care  Description: Discharged to appropriate level of care  1/12/2022 0749 by Almita Jasmine RN  Outcome: Completed  1/11/2022 2103 by Almita Jasmine RN  Outcome: Ongoing     Problem: Constipation:  Goal: Bowel elimination is within specified parameters  Description: Bowel elimination is within specified parameters  1/12/2022 0749 by Almita Jasmine RN  Outcome: Completed  1/11/2022 2103 by Almita Jasmine RN  Outcome: Ongoing     Problem: Fluid Volume - Imbalance:  Goal: Absence of imbalanced fluid volume signs and symptoms  Description: Absence of imbalanced fluid volume signs and symptoms  1/12/2022 0749 by Almita Jasmine RN  Outcome: Completed  1/11/2022 2103 by Almita Jasmine RN  Outcome: Ongoing  Goal: Absence of postpartum hemorrhage signs and symptoms  Description: Absence of postpartum hemorrhage signs and symptoms  1/12/2022 0749 by Almita Jasmine RN  Outcome: Completed  1/11/2022 2103 by Almita Jasmine RN  Outcome: Ongoing     Problem: Infection - Risk of, Puerperal Infection:  Goal: Will show no infection signs and symptoms  Description: Will show no infection signs and symptoms  1/12/2022 0749 by Almita Jasmine RN  Outcome: Completed  1/11/2022 2103 by Almita Jasmine RN  Outcome: Ongoing     Problem: Mood - Altered:  Goal: Mood stable  Description: Mood stable  1/12/2022 0749 by Almita Jasmine RN  Outcome: Completed  1/11/2022 2103 by Almita Jasmine RN  Outcome: Ongoing     Problem: Pain - Acute:  Goal: Pain level will decrease  Description: Pain level will decrease  1/12/2022 0749 by Almita Jasmine RN  Outcome: Completed  1/11/2022 2103 by Almita Jasmine RN  Outcome: Ongoing     Problem: Discharge Planning:  Goal: Discharged to appropriate level of care  Description: Discharged to appropriate level of care  1/12/2022 0750 by Almita Jasmine RN  Outcome: Ongoing  1/12/2022 0750 by Almita Jasmine RN  Outcome: Ongoing

## 2022-01-12 NOTE — PROGRESS NOTES
RN remained at bedside throughout pushing. EFM continuously assessed. Vaginal delivery of viable male infant. [facility].
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
## 2022-01-12 NOTE — FLOWSHEET NOTE
Pt presents to triage 1 with c/o severe pelvic and back discomfort. Pt very uncomfortable; palpable contractions every 2-3 minutes. Pt is very upset about her visit to the dr office today and states that her dr did not check her cervix or listen to her concerns regarding leaking of fluid. Pt leaking clear fluid in moderate amts and with gush from introitus noted with cough. Pt states had a large gush of fluid last night at 2134. States has continued to leak fluid. States spoke with Dr Mary kelsey who asked her to report back to him if leaking continued. She states continued to leak but did not call dr back. She did go to office today regarding her concerns, SVE done. PP very low and this RN unable to determine if cx present. Dr Thomas Corners as OB house dr to room. SVE done: cx very posterior, 3-4 cms, 75% effaced and PP 0 to +1 station. Report of same called to Dr Florez Angry and orders noted. Charge RN informed, SCN informed, NNP informed. Plan is to admit pt for  labor. Discussed same with pt and her SO, who voice understanding of same.

## 2022-01-12 NOTE — PROGRESS NOTES
Dr. Jamey Yadav called and spoke with this RN and was updated on pts UDS and WBC. Also, Dr. Jamey Yadav stated to change Celestone order and if pt is still pregnant, to give the second dose of Celestone 12 hours from the first dose. Will relay information to primary RN R. Mammie Gottron, RN.   Lashawn Nash RN

## 2022-01-12 NOTE — ANESTHESIA PROCEDURE NOTES
CSE Block    Patient location during procedure: OB  Start time: 1/11/2022 7:53 PM  End time: 1/11/2022 8:01 PM  Reason for block: labor epidural  Staffing  Performed: resident/CRNA   Anesthesiologist: Radha Farias MD  Resident/CRNA: MISTI Hamilton CRNA  Preanesthetic Checklist  Completed: patient identified, IV checked, site marked, risks and benefits discussed, monitors and equipment checked, pre-op evaluation, timeout performed, anesthesia consent given, oxygen available and patient being monitored  CSE  Patient position: sitting  Prep: Betadine  Patient monitoring: continuous pulse ox  Approach: midline  Provider prep: mask and sterile gloves  Spinal Needle  Needle type: pencil-tip   Needle gauge: 25 G  Needle length: 4.75 in  Epidural Needle  Injection technique: LUDY saline  Needle type: Tuohy   Needle gauge: 17 G  Needle length: 3.5 in  Location: lumbar (1-5)  Catheter  Catheter type: side hole  Catheter size: 19 G  Epidural test dose: 3 cc of 1.5 % xylocaine with epi. AssessmentT8  Hemodynamics: stable  Additional Notes  Pt. prepped and draped in sterile fashion. Skin wheal with 1% lidocaine. 17ga touhy needle to LUDY. 25 ga. Spinal needle per touhy. CSF visualized in hub and 2 cc of 0.25% bupivacaine injected. Needle withdrawn and catheter threaded. Negative test dose. Catheter secured with sterile dressing.

## 2022-01-12 NOTE — PROGRESS NOTES
Patient actively pushing. RN/MD remain in continuous attendance at the bedside. Assessment & evaluation of fetal heart rate ongoing via continuous EFM.

## 2022-01-12 NOTE — PLAN OF CARE
Problem: Discharge Planning:  Goal: Discharged to appropriate level of care  Description: Discharged to appropriate level of care  1/12/2022 0749 by Ryan King RN  Outcome: Completed

## 2022-01-12 NOTE — PROGRESS NOTES
Dr. Mark Rodriguez ([id number]) here on unit and informed of pt's SVE. Dr. Mark Rodriguez stated that her eta is 10-15 minutes.    Roberto Torres, RN
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
## 2022-01-12 NOTE — PROGRESS NOTES
Pt assisted by this RN and ARABELLA Cronin RN to restroom for first time get up. Pt denies dizziness or lightheadedness. Gait steady. Pt refuses RN assistance to restroom. Pt voided unmeasured urine without difficulty. Pericare discussed with patient, but pt requests fob assist with bathroom and requests RN leaves restroom. Ice pack, witch hazel pads, and dermoplast usage explained and set up by RN. RN remains in room during pericare by pt and fob. New panties put on by pt and fob. Gown changed. Hand hygiene done. Complete linen change done and comfort pad put on bed. Pt tolerated well.

## 2022-01-12 NOTE — PLAN OF CARE
Problem: Discharge Planning:  Goal: Discharged to appropriate level of care  Description: Discharged to appropriate level of care  Outcome: Ongoing     Problem: Constipation:  Goal: Bowel elimination is within specified parameters  Description: Bowel elimination is within specified parameters  Outcome: Ongoing     Problem: Fluid Volume - Imbalance:  Goal: Absence of imbalanced fluid volume signs and symptoms  Description: Absence of imbalanced fluid volume signs and symptoms  Outcome: Ongoing  Goal: Absence of postpartum hemorrhage signs and symptoms  Description: Absence of postpartum hemorrhage signs and symptoms  Outcome: Ongoing     Problem: Infection - Risk of, Puerperal Infection:  Goal: Will show no infection signs and symptoms  Description: Will show no infection signs and symptoms  Outcome: Ongoing     Problem: Mood - Altered:  Goal: Mood stable  Description: Mood stable  Outcome: Ongoing

## 2022-01-14 NOTE — ANESTHESIA POSTPROCEDURE EVALUATION
Department of Anesthesiology  Postprocedure Note    Patient: Trip Titus  MRN: [id number]  YOB: 1995  Date of evaluation: 1/13/2022  Time:  7:17 PM     Procedure Summary     Date: 01/11/22 Room / Location:     Anesthesia Start: 1940 Anesthesia Stop: 01/12/22 0447    Procedure: Labor Analgesia Diagnosis:     Scheduled Providers:  Responsible Provider: Emile Juarez MD    Anesthesia Type: epidural ASA Status: 3 - Emergent          Anesthesia Type: epidural    Jazmin Phase I:      Jazmin Phase II:      Last vitals: Reviewed and per EMR flowsheets. Anesthesia Post Evaluation    Comments: Postoperative Anesthesia Note    Name:    Trip Titus  MRN:      [id number]    Patient Vitals in the past 10 hrs:     LABS:    CBC  Lab Results       Component                Value               Date/Time                  WBC                      35.3 (H)            01/11/2022 06:50 PM        HGB                      13.8                01/11/2022 06:50 PM        HCT                      40.1                01/11/2022 06:50 PM        PLT                      262                 01/11/2022 06:50 PM   RENAL  Lab Results       Component                Value               Date/Time                  NA                       139                 07/17/2021 02:23 PM        K                        3.5                 07/17/2021 02:23 PM        CL                       107                 07/17/2021 02:23 PM        CO2                      23                  07/17/2021 02:23 PM        BUN                      7                   07/17/2021 02:23 PM        CREATININE               <0.5 (L)            07/17/2021 02:23 PM        GLUCOSE                  63 (L)              07/17/2021 02:23 PM   COAGS  No results found for: PROTIME, INR, APTT    Intake & Output:  No intake/output data recorded.      Nausea & Vomiting:  No    Level of Consciousness:  Awake    Pain Assessment:  Adequate analgesia    Anesthesia Complications:  No reported anesthetic complications from neuraxial anesthetic/opiod    SUMMARY      Vital signs stable

## 2023-10-30 ENCOUNTER — HOSPITAL ENCOUNTER (EMERGENCY)
Age: 28
Discharge: HOME | End: 2023-10-30
Attending: STUDENT IN AN ORGANIZED HEALTH CARE EDUCATION/TRAINING PROGRAM
Payer: MEDICAID

## 2023-10-30 VITALS
TEMPERATURE: 97.8 F | RESPIRATION RATE: 16 BRPM | BODY MASS INDEX: 37.67 KG/M2 | WEIGHT: 240 LBS | HEART RATE: 95 BPM | HEIGHT: 67 IN | OXYGEN SATURATION: 96 % | DIASTOLIC BLOOD PRESSURE: 88 MMHG | SYSTOLIC BLOOD PRESSURE: 130 MMHG

## 2023-10-30 DIAGNOSIS — R31.9 URINARY TRACT INFECTION WITH HEMATURIA, SITE UNSPECIFIED: Primary | ICD-10-CM

## 2023-10-30 DIAGNOSIS — Z34.90 PREGNANCY, UNSPECIFIED GESTATIONAL AGE: ICD-10-CM

## 2023-10-30 DIAGNOSIS — N39.0 URINARY TRACT INFECTION WITH HEMATURIA, SITE UNSPECIFIED: Primary | ICD-10-CM

## 2023-10-30 LAB
BILIRUB UR QL STRIP.AUTO: ABNORMAL
CHARACTER UR: ABNORMAL
CLARITY UR: CLEAR
COLOR UR: YELLOW
GLUCOSE UR STRIP.AUTO-MCNC: NEGATIVE MG/DL
HCG UR QL: POSITIVE
HGB UR QL STRIP.AUTO: ABNORMAL
KETONES UR STRIP.AUTO-MCNC: NEGATIVE MG/DL
LEUKOCYTE ESTERASE UR QL STRIP.AUTO: ABNORMAL
NITRITE UR QL STRIP.AUTO: NEGATIVE
PH UR STRIP.AUTO: 6 [PH] (ref 5–8)
PROT UR STRIP.AUTO-MCNC: >=300 MG/DL
RBC #/AREA URNS HPF: >100 /HPF (ref 0–4)
SP GR UR STRIP.AUTO: >=1.03 (ref 1–1.03)
UA DIPSTICK W REFLEX MICRO PNL UR: YES
URN SPEC COLLECT METH UR: ABNORMAL
UROBILINOGEN UR STRIP-ACNC: 0.2 E.U./DL
WBC #/AREA URNS HPF: >100 /HPF (ref 0–5)

## 2023-10-30 PROCEDURE — 84703 CHORIONIC GONADOTROPIN ASSAY: CPT

## 2023-10-30 PROCEDURE — 81001 URINALYSIS AUTO W/SCOPE: CPT

## 2023-10-30 PROCEDURE — 6370000000 HC RX 637 (ALT 250 FOR IP): Performed by: EMERGENCY MEDICINE

## 2023-10-30 PROCEDURE — 99283 EMERGENCY DEPT VISIT LOW MDM: CPT

## 2023-10-30 RX ORDER — PNV NO.95/FERROUS FUM/FOLIC AC 28MG-0.8MG
1 TABLET ORAL DAILY
Qty: 30 TABLET | Refills: 0 | Status: SHIPPED | OUTPATIENT
Start: 2023-10-30

## 2023-10-30 RX ORDER — CEPHALEXIN 500 MG/1
500 CAPSULE ORAL 4 TIMES DAILY
Qty: 28 CAPSULE | Refills: 0 | Status: SHIPPED | OUTPATIENT
Start: 2023-10-30 | End: 2023-11-06

## 2023-10-30 RX ORDER — CEPHALEXIN 500 MG/1
500 CAPSULE ORAL ONCE
Status: COMPLETED | OUTPATIENT
Start: 2023-10-30 | End: 2023-10-30

## 2023-10-30 RX ADMIN — CEPHALEXIN 500 MG: 500 CAPSULE ORAL at 07:22

## 2023-10-30 ASSESSMENT — ENCOUNTER SYMPTOMS
VOMITING: 0
COUGH: 0
DIARRHEA: 0
ABDOMINAL PAIN: 0
RHINORRHEA: 0
BACK PAIN: 0
CHEST TIGHTNESS: 0
SHORTNESS OF BREATH: 0

## 2023-10-30 NOTE — ED PROVIDER NOTES
Point-of-care ultrasound reveals fetal heart tones of 147, hard to obtain measurements secondary to habitus in the emergency department. Patient is having absolutely no abdominal pain, no vaginal bleeding, and has an appointment with [facility] next week. Good fetal movement overall. Discharged home in stable condition. Follow-up with OB/GYN. CLINICAL IMPRESSION:     ICD-10-CM    1. Urinary tract infection with hematuria, site unspecified  N39.0     R31.9       2. Pregnancy, unspecified gestational age  Z32.80             DISPOSITION:  I discussed the results, including any incidental findings, with patient and through shared decision making;   Disposition today from the ED will be: Discharge to home in fair condition. Questions answered. They are agreeable to plan and express understanding of plan. Social Determinants : None      CRITICAL CARE:   Total critical care time is 00 minutes, which excludes separately billable procedures and updating family. Time spent is specifically for management of the presenting complaint and symptoms initially, direct bedside care, reevaluation, review of records, and consultation. There was a high probability of clinically significant life-threatening deterioration in the patient's condition, which required my urgent intervention. _____________________________________    DISCHARGE MEDICATIONS (if applicable):  New Prescriptions    No medications on file       DISCONTINUED MEDICATIONS:  Discontinued Medications    PRENATAL VIT-FE FUMARATE-FA (PRENATAL PLUS) 27-1 MG TABS    Take 1 tablet by mouth daily            DISPOSITION REFERRAL (if applicable):  No follow-up provider specified. _____________________________________      Radha Sadler DO, ([facility]) am the primary attending of record and contributed the majority of evaluation and treatment of emergent care for this encounter.      This chart was generated in part by using

## 2024-01-03 ENCOUNTER — HOSPITAL ENCOUNTER (EMERGENCY)
Age: 29
Discharge: HOME | End: 2024-01-03
Payer: MEDICAID

## 2024-01-03 VITALS
HEIGHT: 67 IN | OXYGEN SATURATION: 99 % | RESPIRATION RATE: 16 BRPM | WEIGHT: 249 LBS | DIASTOLIC BLOOD PRESSURE: 76 MMHG | BODY MASS INDEX: 39.08 KG/M2 | HEART RATE: 94 BPM | TEMPERATURE: 98.4 F | SYSTOLIC BLOOD PRESSURE: 130 MMHG

## 2024-01-03 DIAGNOSIS — Z87.898 HISTORY OF NAUSEA: Primary | ICD-10-CM

## 2024-01-03 PROCEDURE — 99283 EMERGENCY DEPT VISIT LOW MDM: CPT

## 2024-01-03 ASSESSMENT — PAIN - FUNCTIONAL ASSESSMENT: PAIN_FUNCTIONAL_ASSESSMENT: NONE - DENIES PAIN

## 2024-01-03 NOTE — ED NOTES
Discharge instructions reviewed, patient verbalizes understanding. Denies questions/concerns at this time. Patient ambulatory out of ED in stable condition with all belongings.

## 2024-01-10 ASSESSMENT — ENCOUNTER SYMPTOMS
ABDOMINAL PAIN: 0
SHORTNESS OF BREATH: 0
NAUSEA: 1
COLOR CHANGE: 0
RHINORRHEA: 0
SORE THROAT: 0
VOMITING: 0
FACIAL SWELLING: 0
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
## 2024-09-29 ENCOUNTER — APPOINTMENT (OUTPATIENT)
Dept: GENERAL RADIOLOGY | Age: 29
End: 2024-09-29
Payer: MEDICAID

## 2024-09-29 ENCOUNTER — HOSPITAL ENCOUNTER (EMERGENCY)
Age: 29
Discharge: HOME | End: 2024-09-29
Attending: EMERGENCY MEDICINE
Payer: MEDICAID

## 2024-09-29 VITALS
DIASTOLIC BLOOD PRESSURE: 97 MMHG | SYSTOLIC BLOOD PRESSURE: 146 MMHG | WEIGHT: 266 LBS | RESPIRATION RATE: 20 BRPM | HEIGHT: 67 IN | BODY MASS INDEX: 41.75 KG/M2 | OXYGEN SATURATION: 98 % | TEMPERATURE: 98.1 F | HEART RATE: 98 BPM

## 2024-09-29 DIAGNOSIS — S43.421A SPRAIN OF RIGHT ROTATOR CUFF CAPSULE, INITIAL ENCOUNTER: Primary | ICD-10-CM

## 2024-09-29 PROCEDURE — 73030 X-RAY EXAM OF SHOULDER: CPT

## 2024-09-29 PROCEDURE — 99283 EMERGENCY DEPT VISIT LOW MDM: CPT

## 2024-09-29 ASSESSMENT — PAIN - FUNCTIONAL ASSESSMENT: PAIN_FUNCTIONAL_ASSESSMENT: 0-10

## 2024-09-29 ASSESSMENT — PAIN DESCRIPTION - PAIN TYPE: TYPE: ACUTE PAIN

## 2024-09-29 ASSESSMENT — PAIN SCALES - GENERAL: PAINLEVEL_OUTOF10: 8

## 2024-09-30 NOTE — DISCHARGE INSTRUCTIONS
The x-ray show any signs of dislocation of the shoulder.  You likely have a rotator cuff injury.  Please schedule follow-up with the orthopedic doctor to discuss how to further manage your symptoms
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
